# Patient Record
Sex: MALE | Race: WHITE | Employment: FULL TIME | URBAN - METROPOLITAN AREA
[De-identification: names, ages, dates, MRNs, and addresses within clinical notes are randomized per-mention and may not be internally consistent; named-entity substitution may affect disease eponyms.]

---

## 2019-01-03 ENCOUNTER — OFFICE VISIT (OUTPATIENT)
Dept: URGENT CARE | Facility: URGENT CARE | Age: 34
End: 2019-01-03
Payer: COMMERCIAL

## 2019-01-03 ENCOUNTER — ANCILLARY PROCEDURE (OUTPATIENT)
Dept: GENERAL RADIOLOGY | Facility: CLINIC | Age: 34
End: 2019-01-03
Payer: COMMERCIAL

## 2019-01-03 VITALS
OXYGEN SATURATION: 96 % | BODY MASS INDEX: 32.97 KG/M2 | WEIGHT: 236.4 LBS | SYSTOLIC BLOOD PRESSURE: 127 MMHG | TEMPERATURE: 99.5 F | DIASTOLIC BLOOD PRESSURE: 84 MMHG | HEART RATE: 88 BPM

## 2019-01-03 DIAGNOSIS — J22 LOWER RESPIRATORY INFECTION: Primary | ICD-10-CM

## 2019-01-03 LAB
FLUAV+FLUBV AG SPEC QL: NEGATIVE
FLUAV+FLUBV AG SPEC QL: NEGATIVE
SPECIMEN SOURCE: NORMAL

## 2019-01-03 PROCEDURE — 99214 OFFICE O/P EST MOD 30 MIN: CPT | Performed by: PHYSICIAN ASSISTANT

## 2019-01-03 PROCEDURE — 87804 INFLUENZA ASSAY W/OPTIC: CPT | Performed by: PHYSICIAN ASSISTANT

## 2019-01-03 PROCEDURE — 71046 X-RAY EXAM CHEST 2 VIEWS: CPT

## 2019-01-03 RX ORDER — AZITHROMYCIN 250 MG/1
TABLET, FILM COATED ORAL
Qty: 6 TABLET | Refills: 0 | Status: SHIPPED | OUTPATIENT
Start: 2019-01-03 | End: 2019-02-26

## 2019-01-03 RX ORDER — BENZONATATE 200 MG/1
200 CAPSULE ORAL 3 TIMES DAILY PRN
Qty: 30 CAPSULE | Refills: 0 | Status: SHIPPED | OUTPATIENT
Start: 2019-01-03 | End: 2019-02-26

## 2019-01-03 RX ORDER — ALBUTEROL SULFATE 90 UG/1
2 AEROSOL, METERED RESPIRATORY (INHALATION) EVERY 4 HOURS PRN
Qty: 1 INHALER | Refills: 0 | Status: SHIPPED | OUTPATIENT
Start: 2019-01-03 | End: 2024-03-18

## 2019-01-03 ASSESSMENT — ENCOUNTER SYMPTOMS
WEIGHT LOSS: 0
CARDIOVASCULAR NEGATIVE: 1
DIAPHORESIS: 0
GASTROINTESTINAL NEGATIVE: 1
SPUTUM PRODUCTION: 1
SORE THROAT: 1
MYALGIAS: 1
COUGH: 1
EYE PAIN: 0
SHORTNESS OF BREATH: 1
HEMOPTYSIS: 0
PALPITATIONS: 0
WHEEZING: 1
CHILLS: 1
FEVER: 1

## 2019-01-04 NOTE — PROGRESS NOTES
SUBJECTIVE:     HPI  Eren Contreras is a 33 year old male who presents to clinic today for the following health issues:  RESPIRATORY SYMPTOMS    Duration: 1week    Description  nasal congestion, sore throat, cough, shortness of breath, wheezing, fever, chills and myalgias    Severity: moderate    Accompanying signs and symptoms: productive cough but no hemoptysis.  No abdominal pain, n/v, constipation, diarrhea, bloody or black tarry stools.      History (predisposing factors):  tobacco abuse, ill contacts but no pmh of asthma.     Precipitating or alleviating factors: None    Therapies tried and outcome:  rest and fluids with minimal relief    Reviewed PMH, FMH and SOH.  Patient Active Problem List   Diagnosis     CARDIOVASCULAR SCREENING; LDL GOAL LESS THAN 160     Seizures (H)     Influenza A     Current Outpatient Medications   Medication Sig Dispense Refill     OXcarbazepine (TRILEPTAL) 600 MG tablet Take 600 mg by mouth 2 times daily.       OXCARBAZEPINE PO Take 150 mg by mouth 2 times daily       No Known Allergies    Review of Systems   Constitutional: Positive for chills and fever. Negative for diaphoresis and weight loss.   HENT: Positive for congestion and sore throat.    Eyes: Negative for pain.   Respiratory: Positive for cough, sputum production, shortness of breath and wheezing. Negative for hemoptysis.    Cardiovascular: Negative.  Negative for chest pain and palpitations.   Gastrointestinal: Negative.    Musculoskeletal: Positive for myalgias.   Skin: Negative.    All other systems reviewed and are negative.      /84   Pulse 88   Temp 99.5  F (37.5  C) (Tympanic)   Wt 107.2 kg (236 lb 6.4 oz)   SpO2 96%   BMI 32.97 kg/m    Physical Exam   Constitutional: He is oriented to person, place, and time. He appears well-developed and well-nourished. No distress.   HENT:   Head: Normocephalic and atraumatic.   Nose: Nose normal.   Mouth/Throat: Uvula is midline, oropharynx is clear and moist and  mucous membranes are normal. No oropharyngeal exudate or posterior oropharyngeal erythema.   TMs are intact without any erythema or bulging bilaterally.  Airway is patent.   Eyes: Conjunctivae and EOM are normal. Pupils are equal, round, and reactive to light. No scleral icterus.   Neck: Normal range of motion. Neck supple. No thyromegaly present.   Cardiovascular: Normal rate, regular rhythm, normal heart sounds and intact distal pulses. Exam reveals no gallop and no friction rub.   No murmur heard.  Pulmonary/Chest: Effort normal. No accessory muscle usage. No respiratory distress. He has no decreased breath sounds. He has no wheezes. He has no rhonchi. He has rales in the right lower field.   Lymphadenopathy:     He has no cervical adenopathy.   Neurological: He is alert and oriented to person, place, and time.   Skin: Skin is warm, dry and intact. No cyanosis. Nails show no clubbing.   Psychiatric: He has a normal mood and affect. His behavior is normal.   Nursing note and vitals reviewed.  CXR PA/lateral:  No infiltrates, effusions or pneumothorax.  No suspicious nodules or lesions. No fractures.  Per my read.   Will send for overread.        Assessment/Plan:  Lower respiratory infection:  CXR was negative for pneumonia.  Rapid influenza was also negative.  Will treat with zithromax X5days, tessalon perles, and albuterol inh as needed for symptoms.  Recommend treatment with rest, fluids and chicken soup. Tylenol/ibuprofen prn fever/pain.  Recheck in clinic if symptoms worsen or if symptoms do not improve.  To the ER if he develops hemoptysis, chest pain, fevers>102, worsening shortness of breath/wheezing.    -     Influenza A/B antigen  -     XR Chest 2 Views  -     azithromycin (ZITHROMAX) 250 MG tablet; 2 tablets the first day, then 1 tablet daily for the next 4 days  -     albuterol (PROAIR HFA/PROVENTIL HFA/VENTOLIN HFA) 108 (90 Base) MCG/ACT inhaler; Inhale 2 puffs into the lungs every 4 hours as needed  for shortness of breath / dyspnea or wheezing  -     benzonatate (TESSALON) 200 MG capsule; Take 1 capsule (200 mg) by mouth 3 times daily as needed for cough          Albertina See NEGRO Giron

## 2019-02-26 ENCOUNTER — OFFICE VISIT (OUTPATIENT)
Dept: FAMILY MEDICINE | Facility: CLINIC | Age: 34
End: 2019-02-26
Payer: COMMERCIAL

## 2019-02-26 ENCOUNTER — TELEPHONE (OUTPATIENT)
Dept: FAMILY MEDICINE | Facility: CLINIC | Age: 34
End: 2019-02-26

## 2019-02-26 VITALS
HEART RATE: 83 BPM | BODY MASS INDEX: 32.78 KG/M2 | RESPIRATION RATE: 16 BRPM | DIASTOLIC BLOOD PRESSURE: 83 MMHG | OXYGEN SATURATION: 96 % | WEIGHT: 235 LBS | SYSTOLIC BLOOD PRESSURE: 127 MMHG | TEMPERATURE: 98.1 F

## 2019-02-26 DIAGNOSIS — Z91.81 PERSONAL HISTORY OF FALL: ICD-10-CM

## 2019-02-26 DIAGNOSIS — S06.0X0A CONCUSSION WITHOUT LOSS OF CONSCIOUSNESS, INITIAL ENCOUNTER: Primary | ICD-10-CM

## 2019-02-26 DIAGNOSIS — M54.2 NECK PAIN ON LEFT SIDE: ICD-10-CM

## 2019-02-26 PROCEDURE — 99214 OFFICE O/P EST MOD 30 MIN: CPT | Performed by: INTERNAL MEDICINE

## 2019-02-26 ASSESSMENT — PAIN SCALES - GENERAL: PAINLEVEL: MODERATE PAIN (4)

## 2019-02-26 NOTE — PROGRESS NOTES
SUBJECTIVE:  Eren Contreras is an 33 year old male who presents for fall.  This morning about 6:30 am, he slipped and fell and whacked the back of his head on the steel step of his truck.  Felt stunned for a few seconds, doesn't think he lost consciousness. Later started to feel like his head is fuzzy.  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  Feels like takes more effort to focus when reading.  No n/v.  No numbness or weakness.  Left neck and left shoulder hurt some.  No headache.  Has a bump where he hit his head.  Has h/o seizures.   Did not have a seizure today, last seizure was about 2005 and he is on medications.      PMH:   has a past medical history of Seizures (H) (8/1/2014).  Patient Active Problem List   Diagnosis     CARDIOVASCULAR SCREENING; LDL GOAL LESS THAN 160     Seizures (H)     Influenza A     Social History     Socioeconomic History     Marital status:      Spouse name: None     Number of children: None     Years of education: None     Highest education level: None   Occupational History     None   Social Needs     Financial resource strain: None     Food insecurity:     Worry: None     Inability: None     Transportation needs:     Medical: None     Non-medical: None   Tobacco Use     Smoking status: Current Every Day Smoker     Packs/day: 1.00     Types: Cigarettes     Smokeless tobacco: Never Used   Substance and Sexual Activity     Alcohol use: No     Drug use: No     Sexual activity: Yes     Partners: Female   Lifestyle     Physical activity:     Days per week: None     Minutes per session: None     Stress: None   Relationships     Social connections:     Talks on phone: None     Gets together: None     Attends Hinduism service: None     Active member of club or organization: None     Attends meetings of clubs or organizations: None     Relationship status: None     Intimate partner violence:     Fear of current or ex partner: None      Emotionally abused: None     Physically abused: None     Forced sexual activity: None   Other Topics Concern     Parent/sibling w/ CABG, MI or angioplasty before 65F 55M? Not Asked   Social History Narrative     None     Family History   Problem Relation Age of Onset     Other Cancer Maternal Grandmother        ALLERGIES:  Patient has no known allergies.    Current Outpatient Medications   Medication     OXcarbazepine (TRILEPTAL) 600 MG tablet     albuterol (PROAIR HFA/PROVENTIL HFA/VENTOLIN HFA) 108 (90 Base) MCG/ACT inhaler     OXCARBAZEPINE PO     No current facility-administered medications for this visit.          ROS:  ROS is done and is negative for general/constitutional, eye, ENT, Respiratory, cardiovascular, GI, , Skin, musculoskeletal except as noted elsewhere.  All other review of systems negative except as noted elsewhere.      OBJECTIVE:  /83   Pulse 83   Temp 98.1  F (36.7  C) (Tympanic)   Resp 16   Wt 106.6 kg (235 lb)   SpO2 96%   BMI 32.78 kg/m    GENERAL APPEARANCE: Alert, in no acute distress  HEAD: posterior scalp with area of mild edema which is mildly tender, no bruising or erythema, no laceration, no palpable step offs.  EYES: normal, EOM's intact and PERRLA  EARS: External ears normal. Canals clear. TM's normal.  NOSE:normal  OROPHARYNX:normal  NECK:No adenopathy,masses or thyromegaly  RESP: normal and clear to auscultation  CV:regular rate and rhythm and no murmurs, clicks, or gallops  ABDOMEN: Abdomen soft, non-tender. BS normal. No masses, organomegaly  SKIN: no ulcers, lesions or rash  MUSCULOSKELETAL:left and posterior neck musculature with moderate tightness and mild tenderness extending toward left shoulder. normal rom of neck. Normal rom of left shoulder  NEURO: CN 2-12 grossly intact.  Strength 5/5 and symmetric in bilateral upper and lower extremities.  DTRs 2+ and symmetric in bilateral upper and lower extremities.  Sensation to light touch grossly intact in  bilateral upper and lower extremities.    RESULTS  .  No results found for this or any previous visit (from the past 48 hour(s)).    ASSESSMENT/PLAN:    ASSESSMENT / PLAN:  (S06.0X0A) Concussion without loss of consciousness, initial encounter  (primary encounter diagnosis)  Comment: pt fell and hit head, c/w concussion.  Has h/o seizures but no  Seizure activity before or after fall.  sxs c/w concussion.  Currently no evidence of intracranial hemorrhage or stroke.  Plan: CONCUSSION  REFERRAL        refer to concussion  for f/u and ongoing care.  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  He may resume driving when his mentation  And reaction times are normal.  I reviewed supportive care including icing the area of swelling, otc meds to use if needed (tylenol not nsaids), expected course, and signs of concern.  Follow up as needed with pcp if he does not improve within 1 week(s) or if worsens in any way.  Reviewed red flag symptoms and pt is given printed information about concussion and signs of concern and is to go to the ER if experiences any of these.    (M54.2) Neck pain on left side  Comment: currently c/w muscular etiology.  From his fall  Plan: I reviewed supportive care including icing the area, otc meds to use if needed, expected course, and signs of concern.  Follow up as needed or if he does not improve within 1 week(s) or if worsens in any way.  Reviewed red flag symptoms and is to go to the ER if experiences any of these.    (Z91.81) Personal history of fall  Comment: slipped on ice and hit head  Plan: has h/o seizures but no seizure activity before or after the fall, nor contributing to fall.  Plan for injuries as above.      See [facility] for orders, medications, letters, patient instructions    Sayda Amaya M.D.

## 2019-02-26 NOTE — TELEPHONE ENCOUNTER
"Eren Contreras is a 33 year old male who calls with a head injury.    NURSING ASSESSMENT:  Description:  Patient was getting out of his truck and put his foot down on the ice-covered ground and slipped. Hit back of head on the edge of his truck. Lump on back of head but no bleeding.  Onset/duration:  1 hour ago  Loss of consciousness: not sure but it sounds possible. Patient states he \"woke up to my daughter yelling at me but it was like everything was silent\"  Syncope: no  Headache: no. Head feels \"fuzzy\"  Any nausea or vomiting: no  Any amnesia or confusion: no  Any blurring of vision: no  Chest pain or palpitation: no   Taking any blood thinners (Plavix, ASA, warfarin, etc): no     NURSING PLAN: Nursing advice to patient appointment today. Appointment in 1 hour with Dr Amaya  at 8:40 am.  Informed patient that if any new symptoms develop, to go to ER or call 911 and patient states he understands this.    RECOMMENDED DISPOSITION:  appointment  Will comply with recommendation: Yes    Mena Merrill RN, BSN                        "

## 2019-02-26 NOTE — NURSING NOTE
"Chief Complaint   Patient presents with     Head Injury     pt fell getting into his truck, hit the back of his head DOI 2/26/19. He c/o head, neck and shoulder pain       Initial /83   Pulse 83   Temp 98.1  F (36.7  C) (Tympanic)   Resp 16   Wt 106.6 kg (235 lb)   SpO2 96%   BMI 32.78 kg/m   Estimated body mass index is 32.78 kg/m  as calculated from the following:    Height as of 7/20/16: 1.803 m (5' 11\").    Weight as of this encounter: 106.6 kg (235 lb).  Medication Reconciliation: complete  Tere Ortiz MA    "

## 2019-02-26 NOTE — LETTER
February 26, 2019      Eren Contreras  [address] [place], [place] [address]        To Whom It May Concern:    Eren Contreras was seen in our clinic. He is not to work on 2/26/2019.  He may also need to miss work on 2/27 and 2/28/2019.  When his symptoms have improved, he may return to work without restrictions.      Sincerely,        Sayda Amaya MD

## 2021-08-20 ENCOUNTER — NURSE TRIAGE (OUTPATIENT)
Dept: NURSING | Facility: CLINIC | Age: 36
End: 2021-08-20

## 2021-08-20 NOTE — TELEPHONE ENCOUNTER
Patient's wife calling - verbal consent given by patient over the phone.  Patient's wife tested positive for COVID19 yesterday.  His symptoms started Wednesday.  Symptoms include cough, fever, shortness of breath. Last temperature was 103.0 taken in ear.    Triaged to disposition of Go to ED Now (or PCP Triage).  Patient does not have PCP.  Advised ED now.  Patient requests telephone visit with provider.  Transferred to scheduling.  Advised to call back if symptoms worsen.    Padmini Flores RN  Triage Nurse Advisor    COVID 19 Nurse Triage Plan/Patient Instructions    Please be aware that novel coronavirus (COVID-19) may be circulating in the community. If you develop symptoms such as fever, cough, or SOB or if you have concerns about the presence of another infection including coronavirus (COVID-19), please contact your health care provider or visit [email or web address].     Disposition/Instructions    ED Visit recommended. Follow protocol based instructions.     Bring Your Own Device:  Please also bring your smart device(s) (smart phones, tablets, laptops) and their charging cables for your personal use and to communicate with your care team during your visit.    Thank you for taking steps to prevent the spread of this virus.  o Limit your contact with others.  o Wear a simple mask to cover your cough.  o Wash your hands well and often.    Resources    [facility]: About COVID-19: [email or web address]    CDC: What to Do If You're Sick: www.cdc.gov/coronavirus/2019-ncov/about/steps-when-sick.html    CDC: Ending Home Isolation: www.cdc.gov/coronavirus/2019-ncov/hcp/disposition-in-home-patients.html     CDC: Caring for Someone: www.cdc.gov/coronavirus/2019-ncov/if-you-are-sick/care-for-someone.html     [facility]: Interim Guidance for Hospital Discharge to Home: www.health.[facility].[place].us/diseases/coronavirus/hcp/hospdischarge.pdf    [facility] clinical trials (COVID-19 research studies):  [email or web address]-clinical-trials     Below are the COVID-19 hotlines at the [place] Department of Health ([facility]). Interpreters are available.   o For health questions: Call 087-720-2550 or 1-560.201.4473 (7 a.m. to 7 p.m.)  o For questions about schools and childcare: Call 155-129-6810 or 1-350.340.3478 (7 a.m. to 7 p.m.)                     Reason for Disposition    MILD difficulty breathing (e.g., minimal/no SOB at rest, SOB with walking, pulse <100)    Additional Information    Negative: SEVERE difficulty breathing (e.g., struggling for each breath, speaks in single words)    Negative: Difficult to awaken or acting confused (e.g., disoriented, slurred speech)    Negative: Bluish (or gray) lips or face now    Negative: Shock suspected (e.g., cold/pale/clammy skin, too weak to stand, low BP, rapid pulse)    Negative: Sounds like a life-threatening emergency to the triager    Negative: [1] COVID-19 exposure AND [2] has not completed COVID-19 vaccine series AND [3] no symptoms    Negative: [1] COVID-19 exposure AND [2] completed COVID-19 vaccine series (fully vaccinated) AND [3] no symptoms    Negative: COVID-19 vaccine reaction suspected (e.g., fever, headache, muscle aches) occurring during days 1-3 after getting vaccine    Negative: COVID-19 vaccine, questions about    Negative: [1] COVID-19 vaccine series completed (fully vaccinated) in past 3 months AND [2] new-onset of COVID-19 symptoms BUT [3] no known exposure    Negative: [1] Had lab test confirmed COVID-19 infection within last 3 monthsAND [2] new-onset of COVID-19 symptoms BUT [3] no known exposure    Negative: [1] Lives with someone known to have influenza (flu test positive) AND [2] flu-like symptoms (e.g., cough, runny nose, sore throat, SOB; with or without fever)    Negative: [1] Adult with possible COVID-19 symptoms AND [2] triager concerned about severity of symptoms or other causes    Negative: COVID-19 and breastfeeding, questions  about    Negative: SEVERE or constant chest pain or pressure (Exception: mild central chest pain, present only when coughing)    Negative: MODERATE difficulty breathing (e.g., speaks in phrases, SOB even at rest, pulse 100-120)    Negative: [1] Headache AND [2] stiff neck (can't touch chin to chest)    Protocols used: CORONAVIRUS (COVID-19) DIAGNOSED OR EGEHYIAMC-X-IR 3.25

## 2021-08-21 ENCOUNTER — VIRTUAL VISIT (OUTPATIENT)
Dept: URGENT CARE | Facility: CLINIC | Age: 36
End: 2021-08-21
Payer: COMMERCIAL

## 2021-08-21 DIAGNOSIS — Z20.822 SUSPECTED COVID-19 VIRUS INFECTION: Primary | ICD-10-CM

## 2021-08-21 PROCEDURE — 99212 OFFICE O/P EST SF 10 MIN: CPT | Mod: TEL

## 2021-08-21 NOTE — PROGRESS NOTES
Eren's wife is being seen today for covid symptoms (has known covid) via phone. Eren also scheduled a visit in order to find out if there are any treatment options for covid for him. He is already taking OTC analgesics, hydrating and monitoring symptoms.   Based on his age and overall health, I do not think Eren would benefit from any additional therapy. He does not have asthma or other lung disease.   Less than 5min was spent on Eren's evaluation during his wife's visit.   Shantel Bagley MD  Family Medicine

## 2021-08-24 ENCOUNTER — VIRTUAL VISIT (OUTPATIENT)
Dept: URGENT CARE | Facility: CLINIC | Age: 36
End: 2021-08-24
Payer: COMMERCIAL

## 2021-08-24 DIAGNOSIS — R50.9 FEVER AND CHILLS: Primary | ICD-10-CM

## 2021-08-24 PROCEDURE — 99214 OFFICE O/P EST MOD 30 MIN: CPT | Mod: TEL | Performed by: EMERGENCY MEDICINE

## 2021-08-24 NOTE — PROGRESS NOTES
Phone appointment:    Assessment: 1 week history of typical Covid symptoms.  Multiple family members are positive for Covid and patient is not tested assuming positivity.  Over the last 1 to 2 days he feels as if he getting worse.  He has been having fever with a max of 102 203.  He is having headaches and drowsiness.  He does have a cough.  Intermittently slightly short of breath but overall no significant respiratory concerns.  He has had episodic diarrhea.  Patient has a history of seizure disorder but no chronic health disease otherwise.    Plan: Careful plan discussed regarding patient to be seen in the emergency department if getting more ill or no improvement in 48 hours.  Increase antipyretics to add Tylenol to his ibuprofen.  Hydrate aggressively.      CHIEF COMPLAINT: Fever.      HPI: Patient is a 36-year-old male who is had Covid-like symptoms since last Wednesday.  He has had cough, headache, drowsiness, diarrhea, and fever.  T-max over the last several days has been in the 102 103 range.  He has been taking ibuprofen.  Has been able to hydrate adequately.  Wife is tested positive for Covid and multiple family members are positive.  Patient did not get tested assuming he was positive.      ROS: See HPI otherwise normal.    No Known Allergies   Current Outpatient Medications   Medication Sig Dispense Refill     albuterol (PROAIR HFA/PROVENTIL HFA/VENTOLIN HFA) 108 (90 Base) MCG/ACT inhaler Inhale 2 puffs into the lungs every 4 hours as needed for shortness of breath / dyspnea or wheezing (Patient not taking: Reported on 2/26/2019) 1 Inhaler 0     OXcarbazepine (TRILEPTAL) 600 MG tablet Take 600 mg by mouth 2 times daily.       OXCARBAZEPINE PO Take 150 mg by mouth 2 times daily           PE: No acute distress on the phone.  Alert.  Nondyspneic sounding.        TREATMENT: None.      ASSESSMENT: Febrile illness associated with multiple symptoms highly suspicious for Covid.  Patient has no respiratory  distress symptoms and is able to hydrate so monitoring outpatient is reasonable with short-term follow-up if no improvement.      DIAGNOSIS: Fever.  Cough.      PLAN: Aggressive antipyretic therapy with ibuprofen and Tylenol.  Aggressive hydration.  Go to the emergency department if more ill in any way and go to the ER in 48 hours if no improvement.    Time: 10 minutes

## 2021-09-08 ENCOUNTER — TELEPHONE (OUTPATIENT)
Dept: FAMILY MEDICINE | Facility: CLINIC | Age: 36
End: 2021-09-08

## 2021-09-08 NOTE — TELEPHONE ENCOUNTER
Called and spoke to patient, appointment made with Reinaldo Reyes on 9/17/21.Asuncion Pedraza MA/TC

## 2021-09-08 NOTE — TELEPHONE ENCOUNTER
routing to TC pool to postpone message until appointment date, will return forms to provider then. Lidia WATTS -

## 2021-09-08 NOTE — TELEPHONE ENCOUNTER
Reason for Call:  Form, our goal is to have forms completed with 72 hours, however, some forms may require a visit or additional information.    Type of letter, form or note:  FMLA    Who is the form from?: Patient    Where did the form come from: Patient or family brought in       What clinic location was the form placed at?: [place]    Where the form was placed: box for [employer]/Folder    What number is listed as a contact on the form?: [phone]       Additional comments: please call when ready to be  Picked up    Call taken on 9/8/2021 at 8:17 AM by Tatiana Bidr

## 2021-09-08 NOTE — TELEPHONE ENCOUNTER
Needs to be seen/ establish PCP.    I haven't seen this patient since 2016.      Reinaldo Reyes PA-C

## 2021-09-16 NOTE — PROGRESS NOTES
Assessment & Plan       ICD-10-CM    1. Exposure to COVID-19 virus  Z20.822    2. Administrative encounter  Z02.9    Talk to patient about his concerns his forms were filled out and faxed and he was given a copy.  Follow-up as needed.    Return in about 1 year (around 9/17/2022) for recheck, As Needed.    NEGRO Watson [facility]    Saul Jason is a 36 year old who presents for the following health issues     HPI     Was in ED for COVID symptoms on 8/26/2021:sx started about 8/18/2021 and better 9/1/2021. Neg test of COVID on 9/1/2021 and on 9/7/2021 when back to work.   Overall feeling better. Mild cough. No wheezing or short of breath or fever.     Care Everywhere Reviewed   History of Present Illness:   HPI  Eren Contreras is a 36 y.o. male with no significant past medical history who presents to the ED with complaints of fever, headache, cough, diarrhea and shortness of breath for the last 8 days. Patient states multiple family members have recently been diagnosed with COVID-19 and he assumes that he also has this. He feels his symptoms are getting worse and therefore presented to the ED for evaluation. He endorses diarrhea once a day with ongoing painful cough, myalgias, and fevers up to 101.2. He does smoke but states this has been limited lately due to the cough and shortness of breath. He also endorses loss of taste and smell. He has been using ibuprofen as well as Tylenol for symptoms    Form completion for work.    Review of Systems   Constitutional, HEENT, cardiovascular, pulmonary, gi and gu systems are negative, except as otherwise noted.      Objective    /87   Pulse 72   Temp 98.4  F (36.9  C) (Oral)   Ht 1.829 m (6')   Wt 110.6 kg (243 lb 12.8 oz)   SpO2 96%   BMI 33.07 kg/m    Body mass index is 33.07 kg/m .  Physical Exam   GENERAL: healthy, alert and no distress  RESP: lungs clear to auscultation - no rales, rhonchi or wheezes  CV: regular rate  and rhythm, normal S1 S2, no S3 or S4, no murmur, click or rub, no peripheral edema and peripheral pulses strong  ABDOMEN: soft, nontender, no hepatosplenomegaly, no masses and bowel sounds normal  MS: no gross musculoskeletal defects noted, no edema

## 2021-09-17 ENCOUNTER — OFFICE VISIT (OUTPATIENT)
Dept: FAMILY MEDICINE | Facility: CLINIC | Age: 36
End: 2021-09-17
Payer: COMMERCIAL

## 2021-09-17 VITALS
WEIGHT: 243.8 LBS | SYSTOLIC BLOOD PRESSURE: 131 MMHG | HEIGHT: 72 IN | OXYGEN SATURATION: 96 % | BODY MASS INDEX: 33.02 KG/M2 | HEART RATE: 72 BPM | DIASTOLIC BLOOD PRESSURE: 87 MMHG | TEMPERATURE: 98.4 F

## 2021-09-17 DIAGNOSIS — Z02.9 ADMINISTRATIVE ENCOUNTER: ICD-10-CM

## 2021-09-17 DIAGNOSIS — Z20.822 EXPOSURE TO COVID-19 VIRUS: Primary | ICD-10-CM

## 2021-09-17 PROCEDURE — 99213 OFFICE O/P EST LOW 20 MIN: CPT | Performed by: PHYSICIAN ASSISTANT

## 2021-09-17 ASSESSMENT — MIFFLIN-ST. JEOR: SCORE: 2073.87

## 2023-05-15 ENCOUNTER — OFFICE VISIT (OUTPATIENT)
Dept: URGENT CARE | Facility: URGENT CARE | Age: 38
End: 2023-05-15
Payer: COMMERCIAL

## 2023-05-15 VITALS
BODY MASS INDEX: 34.12 KG/M2 | TEMPERATURE: 98.1 F | SYSTOLIC BLOOD PRESSURE: 136 MMHG | DIASTOLIC BLOOD PRESSURE: 91 MMHG | HEART RATE: 90 BPM | OXYGEN SATURATION: 97 % | WEIGHT: 251.6 LBS

## 2023-05-15 DIAGNOSIS — R10.2 SUPRAPUBIC PAIN, ACUTE: Primary | ICD-10-CM

## 2023-05-15 DIAGNOSIS — R39.15 URINARY URGENCY: ICD-10-CM

## 2023-05-15 DIAGNOSIS — N50.819 PERSISTENT TESTICULAR PAIN: ICD-10-CM

## 2023-05-15 LAB
ALBUMIN UR-MCNC: ABNORMAL MG/DL
APPEARANCE UR: CLEAR
BACTERIA #/AREA URNS HPF: ABNORMAL /HPF
BASOPHILS # BLD AUTO: 0 10E3/UL (ref 0–0.2)
BASOPHILS NFR BLD AUTO: 0 %
BILIRUB UR QL STRIP: NEGATIVE
CAOX CRY #/AREA URNS HPF: ABNORMAL /HPF
COLOR UR AUTO: YELLOW
EOSINOPHIL # BLD AUTO: 0 10E3/UL (ref 0–0.7)
EOSINOPHIL NFR BLD AUTO: 1 %
ERYTHROCYTE [DISTWIDTH] IN BLOOD BY AUTOMATED COUNT: 13.6 % (ref 10–15)
GLUCOSE UR STRIP-MCNC: NEGATIVE MG/DL
HCT VFR BLD AUTO: 47.7 % (ref 40–53)
HGB BLD-MCNC: 16.2 G/DL (ref 13.3–17.7)
HGB UR QL STRIP: ABNORMAL
KETONES UR STRIP-MCNC: NEGATIVE MG/DL
LEUKOCYTE ESTERASE UR QL STRIP: NEGATIVE
LYMPHOCYTES # BLD AUTO: 3 10E3/UL (ref 0.8–5.3)
LYMPHOCYTES NFR BLD AUTO: 43 %
MCH RBC QN AUTO: 29.9 PG (ref 26.5–33)
MCHC RBC AUTO-ENTMCNC: 34 G/DL (ref 31.5–36.5)
MCV RBC AUTO: 88 FL (ref 78–100)
MONOCYTES # BLD AUTO: 0.5 10E3/UL (ref 0–1.3)
MONOCYTES NFR BLD AUTO: 8 %
MUCOUS THREADS #/AREA URNS LPF: PRESENT /LPF
NEUTROPHILS # BLD AUTO: 3.4 10E3/UL (ref 1.6–8.3)
NEUTROPHILS NFR BLD AUTO: 48 %
NITRATE UR QL: NEGATIVE
PH UR STRIP: 6 [PH] (ref 5–7)
PLATELET # BLD AUTO: 185 10E3/UL (ref 150–450)
RBC # BLD AUTO: 5.41 10E6/UL (ref 4.4–5.9)
RBC #/AREA URNS AUTO: ABNORMAL /HPF
SP GR UR STRIP: >=1.03 (ref 1–1.03)
SQUAMOUS #/AREA URNS AUTO: ABNORMAL /LPF
UROBILINOGEN UR STRIP-ACNC: 0.2 E.U./DL
WBC # BLD AUTO: 7 10E3/UL (ref 4–11)
WBC #/AREA URNS AUTO: ABNORMAL /HPF

## 2023-05-15 PROCEDURE — 87491 CHLMYD TRACH DNA AMP PROBE: CPT | Performed by: FAMILY MEDICINE

## 2023-05-15 PROCEDURE — 36415 COLL VENOUS BLD VENIPUNCTURE: CPT | Performed by: FAMILY MEDICINE

## 2023-05-15 PROCEDURE — 87591 N.GONORRHOEAE DNA AMP PROB: CPT | Performed by: FAMILY MEDICINE

## 2023-05-15 PROCEDURE — 99213 OFFICE O/P EST LOW 20 MIN: CPT | Performed by: FAMILY MEDICINE

## 2023-05-15 PROCEDURE — 81001 URINALYSIS AUTO W/SCOPE: CPT | Performed by: FAMILY MEDICINE

## 2023-05-15 PROCEDURE — 85025 COMPLETE CBC W/AUTO DIFF WBC: CPT | Performed by: FAMILY MEDICINE

## 2023-05-15 ASSESSMENT — PAIN SCALES - GENERAL: PAINLEVEL: MODERATE PAIN (4)
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
## 2023-05-16 ENCOUNTER — TELEPHONE (OUTPATIENT)
Dept: UROLOGY | Facility: CLINIC | Age: 38
End: 2023-05-16
Payer: COMMERCIAL

## 2023-05-16 LAB
C TRACH DNA SPEC QL PROBE+SIG AMP: NEGATIVE
N GONORRHOEA DNA SPEC QL NAA+PROBE: NEGATIVE

## 2023-05-16 NOTE — LETTER
[address]  [facility] [address]  [phone]      May 24, 2023    Eren Contreras                                                                                                                     [address] [facility] [address]            Dear Eren,    Dear Eren Contreras      We, at [facility] want to help you receive better care.  We have been unable to reach you via phone and  are asking that you schedule an appointment with one of our urologists. You can call the clinic at [phone] to schedule an appointment.      Sincerely,      [facility]

## 2023-05-16 NOTE — TELEPHONE ENCOUNTER
[facility] Call Center    Phone Message    May a detailed message be left on voicemail: yes     Reason for Call: Patient being referred for Urgent Appointment (1-2 weeks) for Micro Hematuria. Patient only has one day off right now on 6/20. I tried to pull up template for Dr. Krishnan in [place], but unable to pull it up. Sending for clinic review and follow-up with patient. Okay to leave detailed message on voicemail. Thank you!    Action Taken: [facility]      Travel Screening: Not Applicable

## 2023-05-17 NOTE — TELEPHONE ENCOUNTER
Writer called and left VM for patient to call back.  Awaiting call back.    Alyson TREVINO RN Urology 5/17/2023 9:23 AM

## 2023-05-24 NOTE — TELEPHONE ENCOUNTER
Writer called and left a second voicemail.  Letter sent via [facility] to schedule an appointment.    Alyson TREVINO RN Urology 5/24/2023 8:17 AM

## 2023-05-30 NOTE — TELEPHONE ENCOUNTER
The patient returned call regarding message below. He is requesting 6/20/23 if possible. Writer verified phone number is correct. Please contact patient again. Thank you.

## 2023-05-30 NOTE — TELEPHONE ENCOUNTER
Appt made for next available. Offered pt [facility] scheduling, however he refused.    Alyson TREVINO RN Urology 5/30/2023 1:59 PM

## 2023-10-06 ENCOUNTER — OFFICE VISIT (OUTPATIENT)
Dept: URGENT CARE | Facility: URGENT CARE | Age: 38
End: 2023-10-06

## 2023-10-06 ENCOUNTER — NURSE TRIAGE (OUTPATIENT)
Dept: INTERNAL MEDICINE | Facility: CLINIC | Age: 38
End: 2023-10-06

## 2023-10-06 ENCOUNTER — ANCILLARY PROCEDURE (OUTPATIENT)
Dept: GENERAL RADIOLOGY | Facility: CLINIC | Age: 38
End: 2023-10-06
Attending: PHYSICIAN ASSISTANT

## 2023-10-06 VITALS
BODY MASS INDEX: 33.63 KG/M2 | RESPIRATION RATE: 16 BRPM | WEIGHT: 248 LBS | TEMPERATURE: 98.4 F | SYSTOLIC BLOOD PRESSURE: 150 MMHG | OXYGEN SATURATION: 97 % | HEART RATE: 72 BPM | DIASTOLIC BLOOD PRESSURE: 92 MMHG
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
## 2023-10-06 PROCEDURE — 90714 TD VACC NO PRESV 7 YRS+ IM: CPT | Performed by: PHYSICIAN ASSISTANT

## 2023-10-06 PROCEDURE — 73140 X-RAY EXAM OF FINGER(S): CPT | Mod: TC | Performed by: RADIOLOGY

## 2023-10-06 PROCEDURE — 90471 IMMUNIZATION ADMIN: CPT | Performed by: PHYSICIAN ASSISTANT

## 2023-10-06 PROCEDURE — 99213 OFFICE O/P EST LOW 20 MIN: CPT | Mod: 25 | Performed by: PHYSICIAN ASSISTANT

## 2023-10-06 RX ORDER — AMOXICILLIN AND CLAVULANATE POTASSIUM 500; 125 MG/1; MG/1
1 TABLET, FILM COATED ORAL 3 TIMES DAILY
Qty: 15 TABLET | Refills: 1 | Status: SHIPPED | OUTPATIENT
Start: 2023-10-06 | End: 2023-10-11

## 2023-10-06 NOTE — PROGRESS NOTES
HPI  pleasant, NAD 37 yo with right thumb dog bite last night. It's his own dog, dog is completely UTD on shots, denies any numbness or tingling, was actually in a tug of war with  dog ( dog is not himself  -- is having it removed from home) dog is acting its notrmal self today      ROS been years since last tetanus ( 10or 15?)  shot, hx  seizures, on trileptal no known DM , works physical job and is not SHORT OF BREATH on the job      Physical Exam  Constitutional:       General: He is not in acute distress.     Appearance: Normal appearance. He is well-developed. He is not diaphoretic.   HENT:      Head: Normocephalic and atraumatic.      Right Ear: Ear canal and external ear normal. No drainage.      Left Ear: Ear canal and external ear normal. No drainage.      Nose: Nose normal.      Mouth/Throat:      Pharynx: No oropharyngeal exudate.   Eyes:      General: Lids are normal. No scleral icterus.        Right eye: No discharge.         Left eye: No discharge.      Conjunctiva/sclera: Conjunctivae normal.      Right eye: Right conjunctiva is not injected.      Left eye: Left conjunctiva is not injected.      Pupils: Pupils are equal, round, and reactive to light.   Neck:      Thyroid: No thyromegaly.      Trachea: No tracheal deviation.   Cardiovascular:      Rate and Rhythm: Normal rate and regular rhythm.      Pulses: Normal pulses.      Heart sounds: Normal heart sounds. No murmur heard.     No friction rub.   Pulmonary:      Effort: Pulmonary effort is normal. No respiratory distress.      Breath sounds: Normal breath sounds. No stridor. No wheezing, rhonchi or rales.      Comments: Respiratory rate normal, no stridor, no respiratory distress,No wheeze, no retractions, no rales, breath sounds present in all fields, nl bronchophony and fremitus, non-tender to palp      Chest:      Chest wall: No tenderness.   Abdominal:      General: Bowel sounds are normal.      Palpations: Abdomen is soft.      Tenderness:  There is no abdominal tenderness.   Genitourinary:     Rectum: Guaiac result negative.   Musculoskeletal:         General: Normal range of motion.      Cervical back: Normal range of motion and neck supple. No edema or rigidity. No spinous process tenderness.      Comments: Right thumb + puncture wounds x 3: jsut distal to dip dorsal crease  radially;  #2 just distal and toward pulp of thumb, #3 at radial margin of middle 1/3rd nail, no rubor, no calor no proxiaml lymphatic streaking , no epitrochlear or axilalry nodes ( to early)     Lymphadenopathy:      Cervical: No cervical adenopathy.      Right cervical: No superficial or posterior cervical adenopathy.     Left cervical: No superficial or posterior cervical adenopathy.   Skin:     General: Skin is warm.      Nails: There is no clubbing.   Neurological:      Mental Status: He is alert.      Cranial Nerves: No cranial nerve deficit.      Sensory: No sensory deficit.   Psychiatric:         Speech: Speech normal.         Behavior: Behavior normal.         Thought Content: Thought content normal.         Summary / plan:  ZACHARY called wet read of xray (per ZACHARY)  nl xray  Start refill after 5 days if any redness persisting and seek re-exam  Discussed the course of animal bites and signs of worrisome inflection -- get seen if any doubt.  No interaction with tripletal  noted in [employer]

## 2023-10-06 NOTE — TELEPHONE ENCOUNTER
"S-(situation): dog bite on right hand    B-(background): was bit last evening. It bled a lot and patient cleaned it out.     A-(assessment): one puncture is still open and one area that looks the size of a thumb tack.     R-(recommendations): recommended to go to the ER/UCC.      Reason for Disposition   Cut (length > 1/8 inch or 3 mm) or skin tear and any animal  (Exception: Superficial scratch that doesn't go through dermis.)    Additional Information   Negative: Major bleeding (actively dripping or spurting) that can't be stopped   Negative: Sounds like a life-threatening emergency to the triager   Negative: Any break in skin from BITE (e.g., cut, puncture, or scratch) and WILD animal at risk for RABIES (e.g., bat, raccoon, pace, skunk, coyote, other carnivores; see Background for list)   Negative: Any break in skin from BITE (e.g., cut, puncture, or scratch) and PET animal (e.g., dog, cat, or ferret) at risk for RABIES (e.g., sick, stray, unprovoked bite, developing country)   Negative: Any break in skin from BITE (e.g., cut, puncture, or scratch) and monkey    Answer Assessment - Initial Assessment Questions  1. ANIMAL: \"What type of animal caused the bite?\" \"Is the injury from a bite or a claw?\" If the animal is a dog or a cat, ask: \"Was it a pet or a stray?\" \"Was it acting ill or behaving strangely?\"      dog  2. LOCATION: \"Where is the bite located?\"       Right hand  3. SIZE: \"How big is the bite?\" \"What does it look like?\"       Right thumb- on side where pad of thumb, another on right side of thumbnail, right side of thumb there is a cut.    4. ONSET: \"When did the bite happen?\" (Minutes or hours ago)       Last evening  5. CIRCUMSTANCES: \"Tell me how this happened.\"       They think the dog has night terrors.  Wakes up at night barking, snarling and biting at the air.    6. TETANUS: \"When was your last tetanus booster?\"      20 years ago  7. RABIES VACCINE: For dog or cat bites, ask: \"Do you know if the " "pet is vaccinated against rabies?\"  (e.g., yes, no, overdue for rabies shot, unknown)      Has had all shots  8. PREGNANCY: \"Is there any chance you are pregnant?\" \"When was your last menstrual period?\"      N/a    Protocols used: Animal Bite-A-[place]    "

## 2023-11-20 ENCOUNTER — TELEPHONE (OUTPATIENT)
Dept: FAMILY MEDICINE | Facility: CLINIC | Age: 38
End: 2023-11-20

## 2023-11-20 NOTE — LETTER
November 20, 2023    To  Eren Contreras  [address] [facility] [address]    Your team at [facility] cares about your health. We have reviewed your chart and based on our findings; we are making the following recommendations to better manage your health.     You are in particular need of attention regarding the following:     PREVENTATIVE VISIT: Physical    If you have already completed these items, please contact the clinic via phone or   MyChart so your care team can review and update your records. Thank you for   choosing [facility] for your healthcare needs. For any questions,   concerns, or to schedule an appointment please contact our clinic.    Healthy Regards,      Your [facility] Care Team

## 2023-11-20 NOTE — TELEPHONE ENCOUNTER
Patient Quality Outreach    Patient is due for the following:   Depression  -  PHQ-9 needed  Physical Preventive Adult Physical      Topic Date Due    COVID-19 Vaccine (1) Never done    Pneumococcal Vaccine (1 - PCV) Never done    Hepatitis B Vaccine (2 of 3 - 3-dose series) 01/24/2003    Flu Vaccine (1) Never done    Diptheria Tetanus Pertussis (DTAP/TDAP/TD) Vaccine (1 - Tdap) 10/07/2023       Next Steps:   Schedule a Adult Preventative    Type of outreach:    Sent [employer] message.      Questions for provider review:    None           Candace Duncan, CMA

## 2023-12-04 ENCOUNTER — OFFICE VISIT (OUTPATIENT)
Dept: URGENT CARE | Facility: URGENT CARE | Age: 38
End: 2023-12-04
Payer: COMMERCIAL

## 2023-12-04 VITALS
WEIGHT: 254 LBS | DIASTOLIC BLOOD PRESSURE: 95 MMHG | OXYGEN SATURATION: 98 % | TEMPERATURE: 98.7 F | RESPIRATION RATE: 16 BRPM | BODY MASS INDEX: 34.45 KG/M2 | HEART RATE: 88 BPM | SYSTOLIC BLOOD PRESSURE: 145 MMHG

## 2023-12-04 DIAGNOSIS — R56.9 SEIZURES (H): ICD-10-CM

## 2023-12-04 DIAGNOSIS — J01.90 ACUTE BACTERIAL RHINOSINUSITIS: Primary | ICD-10-CM

## 2023-12-04 DIAGNOSIS — F17.200 SMOKER: ICD-10-CM

## 2023-12-04 DIAGNOSIS — B96.89 ACUTE BACTERIAL RHINOSINUSITIS: Primary | ICD-10-CM

## 2023-12-04 PROCEDURE — 99213 OFFICE O/P EST LOW 20 MIN: CPT | Performed by: NURSE PRACTITIONER

## 2023-12-04 NOTE — PROGRESS NOTES
Assessment & Plan       1. Acute bacterial rhinosinusitis  Home care reviewed. Patient verbalized understanding; will monitor symptoms closely. Reviewed s/e to medications.   Follow up with primary care in 1 week if symptoms not improving.     Handout given from epic and reviewed.    - amoxicillin-clavulanate (AUGMENTIN) 875-125 MG tablet; Take 1 tablet by mouth 2 times daily for 7 days  Dispense: 14 tablet; Refill: 0    2. Seizures (H)  Stable      3. Smoker  Discussed recommend cessation      BLANCA Fuentes [facility]    Saul Jason is a 38 year old male who presents to clinic today for the following health issues:  Chief Complaint   Patient presents with    Urgent Care    URI     Per patient symptoms started last week Wednesday symptoms cough, left ear pain, left side throat pain, fever , left eye discharge, and body aches only on Friday      HPI      URI Adult    Onset of symptoms was 7 day(s) ago.  Course of illness is worsening.    Severity moderate  Current and Associated symptoms: fever, chills, runny nose, stuffy nose, ear pain left, sore throat, and facial pain/pressure  Treatment measures tried include Tylenol/Ibuprofen, OTC Cough med, Fluids, and Rest.  Predisposing factors include ill contact: Family member .      Review of Systems  Constitutional, HEENT, cardiovascular, pulmonary, gi and gu systems are negative, except as otherwise noted.      Objective    BP (!) 145/95   Pulse 88   Temp 98.7  F (37.1  C) (Tympanic)   Resp 16   Wt 115.2 kg (254 lb)   SpO2 98%   BMI 34.45 kg/m    Physical Exam   GENERAL: alert and no distress  EYES: Eyes grossly normal to inspection, PERRL and conjunctivae and sclerae normal  HENT: normal cephalic/atraumatic, left ear: erythematous, nose and mouth without ulcers or lesions, nasal mucosa edematous , rhinorrhea clear, oropharynx clear, oral mucous membranes moist, and sinuses: maxillary, frontal tenderness on  bilateral  NECK: bilateral anterior cervical adenopathy, no asymmetry, masses, or scars, and thyroid normal to palpation  RESP: lungs clear to auscultation - no rales, rhonchi or wheezes  CV: regular rate and rhythm, normal S1 S2, no S3 or S4, no murmur, click or rub, no peripheral edema and peripheral pulses strong  ABDOMEN: soft, nontender, no hepatosplenomegaly, no masses and bowel sounds normal  MS: no gross musculoskeletal defects noted, no edema  SKIN: no suspicious lesions or rashes

## 2023-12-04 NOTE — PATIENT INSTRUCTIONS
Home care for sinusitis includes; antibiotic take Augmentin 2 times daily with food as this may cause stomach upset. Please take with a probiotic (not directly with) two hours prior or after, to protect good bacteria in colon.   Humidifier in room if available. Recommend saline lavage to help remove mucous and moisturize sinuses.     Please follow up in clinic, with your primary care provider if symptoms not improving with treatment.

## 2024-02-23 ENCOUNTER — HOSPITAL ENCOUNTER (OUTPATIENT)
Dept: ULTRASOUND IMAGING | Facility: CLINIC | Age: 39
Discharge: HOME | End: 2024-02-23
Payer: COMMERCIAL

## 2024-02-23 ENCOUNTER — OFFICE VISIT (OUTPATIENT)
Dept: FAMILY MEDICINE | Facility: CLINIC | Age: 39
End: 2024-02-23
Payer: COMMERCIAL

## 2024-02-23 ENCOUNTER — OFFICE VISIT (OUTPATIENT)
Dept: PEDIATRICS | Facility: CLINIC | Age: 39
End: 2024-02-23
Payer: COMMERCIAL

## 2024-02-23 VITALS
OXYGEN SATURATION: 96 % | SYSTOLIC BLOOD PRESSURE: 170 MMHG | HEART RATE: 71 BPM | TEMPERATURE: 97.7 F | BODY MASS INDEX: 35 KG/M2 | HEIGHT: 71 IN | RESPIRATION RATE: 20 BRPM | WEIGHT: 250 LBS | DIASTOLIC BLOOD PRESSURE: 91 MMHG

## 2024-02-23 VITALS
RESPIRATION RATE: 16 BRPM | BODY MASS INDEX: 35.11 KG/M2 | HEART RATE: 80 BPM | OXYGEN SATURATION: 98 % | TEMPERATURE: 97.6 F | DIASTOLIC BLOOD PRESSURE: 94 MMHG | HEIGHT: 71 IN | WEIGHT: 250.8 LBS | SYSTOLIC BLOOD PRESSURE: 128 MMHG

## 2024-02-23 DIAGNOSIS — R10.9 FLANK PAIN: Primary | ICD-10-CM

## 2024-02-23 DIAGNOSIS — Z87.442 HISTORY OF NEPHROLITHIASIS: ICD-10-CM

## 2024-02-23 DIAGNOSIS — R10.9 ACUTE LEFT FLANK PAIN: ICD-10-CM

## 2024-02-23 DIAGNOSIS — R10.9 ACUTE LEFT FLANK PAIN: Primary | ICD-10-CM

## 2024-02-23 LAB
ALBUMIN SERPL BCG-MCNC: 3.9 G/DL (ref 3.5–5.2)
ALBUMIN UR-MCNC: NEGATIVE MG/DL
ALP SERPL-CCNC: 115 U/L (ref 40–150)
ALT SERPL W P-5'-P-CCNC: 26 U/L (ref 0–70)
ANION GAP SERPL CALCULATED.3IONS-SCNC: 10 MMOL/L (ref 7–15)
APPEARANCE UR: CLEAR
AST SERPL W P-5'-P-CCNC: 19 U/L (ref 0–45)
BACTERIA #/AREA URNS HPF: ABNORMAL /HPF
BILIRUB SERPL-MCNC: 0.3 MG/DL
BILIRUB UR QL STRIP: NEGATIVE
BUN SERPL-MCNC: 12.3 MG/DL (ref 6–20)
CALCIUM SERPL-MCNC: 8.5 MG/DL (ref 8.6–10)
CHLORIDE SERPL-SCNC: 105 MMOL/L (ref 98–107)
COLOR UR AUTO: YELLOW
CREAT SERPL-MCNC: 1.19 MG/DL (ref 0.67–1.17)
DEPRECATED HCO3 PLAS-SCNC: 22 MMOL/L (ref 22–29)
EGFRCR SERPLBLD CKD-EPI 2021: 80 ML/MIN/1.73M2
ERYTHROCYTE [DISTWIDTH] IN BLOOD BY AUTOMATED COUNT: 12.9 % (ref 10–15)
GLUCOSE SERPL-MCNC: 109 MG/DL (ref 70–99)
GLUCOSE UR STRIP-MCNC: NEGATIVE MG/DL
HCT VFR BLD AUTO: 42.4 % (ref 40–53)
HGB BLD-MCNC: 14.5 G/DL (ref 13.3–17.7)
HGB UR QL STRIP: ABNORMAL
KETONES UR STRIP-MCNC: NEGATIVE MG/DL
LEUKOCYTE ESTERASE UR QL STRIP: NEGATIVE
MCH RBC QN AUTO: 30 PG (ref 26.5–33)
MCHC RBC AUTO-ENTMCNC: 34.2 G/DL (ref 31.5–36.5)
MCV RBC AUTO: 88 FL (ref 78–100)
MUCOUS THREADS #/AREA URNS LPF: PRESENT /LPF
NITRATE UR QL: NEGATIVE
PH UR STRIP: 7 [PH] (ref 5–7)
PLATELET # BLD AUTO: 146 10E3/UL (ref 150–450)
POTASSIUM SERPL-SCNC: 3.8 MMOL/L (ref 3.4–5.3)
PROCALCITONIN SERPL IA-MCNC: 0.09 NG/ML
PROT SERPL-MCNC: 6.3 G/DL (ref 6.4–8.3)
RBC # BLD AUTO: 4.84 10E6/UL (ref 4.4–5.9)
RBC #/AREA URNS AUTO: ABNORMAL /HPF
SODIUM SERPL-SCNC: 137 MMOL/L (ref 135–145)
SP GR UR STRIP: 1.02 (ref 1–1.03)
URATE SERPL-MCNC: 5.6 MG/DL (ref 3.4–7)
UROBILINOGEN UR STRIP-ACNC: 0.2 E.U./DL
WBC # BLD AUTO: 8.8 10E3/UL (ref 4–11)
WBC #/AREA URNS AUTO: ABNORMAL /HPF

## 2024-02-23 PROCEDURE — 84550 ASSAY OF BLOOD/URIC ACID: CPT

## 2024-02-23 PROCEDURE — 76775 US EXAM ABDO BACK WALL LIM: CPT

## 2024-02-23 PROCEDURE — 85027 COMPLETE CBC AUTOMATED: CPT

## 2024-02-23 PROCEDURE — 81001 URINALYSIS AUTO W/SCOPE: CPT | Performed by: PHYSICIAN ASSISTANT

## 2024-02-23 PROCEDURE — 36415 COLL VENOUS BLD VENIPUNCTURE: CPT

## 2024-02-23 PROCEDURE — 99214 OFFICE O/P EST MOD 30 MIN: CPT | Mod: 25

## 2024-02-23 PROCEDURE — 80053 COMPREHEN METABOLIC PANEL: CPT

## 2024-02-23 PROCEDURE — 99207 REFERRAL TO ACUTE AND DIAGNOSTIC SERVICES: CPT | Performed by: PHYSICIAN ASSISTANT

## 2024-02-23 PROCEDURE — 84145 PROCALCITONIN (PCT): CPT

## 2024-02-23 PROCEDURE — 96374 THER/PROPH/DIAG INJ IV PUSH: CPT

## 2024-02-23 PROCEDURE — 96361 HYDRATE IV INFUSION ADD-ON: CPT

## 2024-02-23 RX ORDER — KETOROLAC TROMETHAMINE 30 MG/ML
60 INJECTION, SOLUTION INTRAMUSCULAR; INTRAVENOUS ONCE
Status: DISCONTINUED | OUTPATIENT
Start: 2024-02-23 | End: 2024-02-23

## 2024-02-23 RX ORDER — KETOROLAC TROMETHAMINE 30 MG/ML
30 INJECTION, SOLUTION INTRAMUSCULAR; INTRAVENOUS ONCE
Status: COMPLETED | OUTPATIENT
Start: 2024-02-23 | End: 2024-02-23

## 2024-02-23 RX ADMIN — KETOROLAC TROMETHAMINE 30 MG: 30 INJECTION, SOLUTION INTRAMUSCULAR; INTRAVENOUS at 11:08

## 2024-02-23 ASSESSMENT — PAIN SCALES - GENERAL
PAINLEVEL: EXTREME PAIN (8)
PAINLEVEL: SEVERE PAIN (6)

## 2024-02-23 NOTE — PROGRESS NOTES
"  Assessment & Plan     (R10.9) Flank pain  (primary encounter diagnosis)  Comment: History of flank pain.  Patient with  history of kidney stones.  Patient states symptoms feel similar to prior kidney stone.  Has left CVA tenderness.  Also experiencing left lower quadrant abdominal tenderness.  Discussed with the patient given the severity of the symptoms following up with acute diagnostic center for further evaluation and treatment.  Patient was amenable to this.  Urinalysis does have red blood cell.  Patient will go directly to [facility].  Plan: UA Macroscopic with reflex to Microscopic and         Culture - Lab Collect, UA Microscopic with         Reflex to Culture            Process for making after hours referrals to the ADS    1.  Send an Epic message to the ADS clinic pool at patient preferred site  [place]: [facility] ([id number])  [place]: P [facility] ([id number])  [place]: P [facility] ([id number])    [place]: P [facility] ([id number])  [place]: [facility] [facility] ([id number])  Include the reason for the referral and any pertinent information.    2.  Verify that the patient is medically stable and willing to be seen shortly after 9 am the next day the clinic is open.  If the patient is having abdominal or GI symptoms, consider telling them to be NPO if you think imaging will be needed.    3.  Give the patient the phone number for the site.  Have the patient call the ADS if they haven't been contacted by 10 am.  [place]: [phone]  [place]:  [phone]  [place]: [phone]   [place]:  [phone]    [phone]      BMI  Estimated body mass index is 34.98 kg/m  as calculated from the following:    Height as of this encounter: 1.803 m (5' 11\").    Weight as of this encounter: 113.8 kg (250 lb 12.8 oz).     Saul Jason is a 38 year old, presenting for the following health issues:  Kidney Problem    History of Present Illness       Reason for " "visit:  Kidney stones    He eats 0-1 servings of fruits and vegetables daily.He consumes 2 sweetened beverage(s) daily.He exercises with enough effort to increase his heart rate 20 to 29 minutes per day.  He exercises with enough effort to increase his heart rate 5 days per week.   He is taking medications regularly.     History of right-sided kidney stone.  1 week ago patient developed left flank pain.  This has been worsening in severity.  No diarrhea or constipation.  Denies any bloody stools.  Has been nauseated without any vomiting.  Denies any overt dysuria.  Occasionally discomfort radiates into the left testicle.  No significant testicular pain.  Denies any trauma or injury associate with onset of discomfort.         Review of Systems  Constitutional, HEENT, cardiovascular, pulmonary, gi and gu systems are negative, except as otherwise noted.      Objective    BP (!) 128/94   Pulse 80   Temp 97.6  F (36.4  C) (Tympanic)   Resp 16   Ht 1.803 m (5' 11\")   Wt 113.8 kg (250 lb 12.8 oz)   SpO2 98%   BMI 34.98 kg/m    Body mass index is 34.98 kg/m .  Physical Exam   GENERAL: alert and appears uncomfortable.  Not able to sit still in examination room.  RESP: lungs clear to auscultation - no rales, rhonchi or wheezes  CV: regular rate and rhythm, normal S1 S2, no S3 or S4, no murmur, click or rub, no peripheral edema  ABDOMEN: Left CVA tenderness and left lower quadrant abdominal tenderness.  No right-sided abdominal pain.  : No significant testicular pain or swelling.  Normal-appearing external genitalia  MS: no gross musculoskeletal defects noted, no edema            Signed Electronically by: Haile Mckoy PA-C    "

## 2024-02-23 NOTE — PATIENT INSTRUCTIONS
As we discussed, although there is evidence of kidney stones within your left kidney itself, there are no stones in the ureter draining the kidneys, and no evidence of a stone obstructing any part of your urinary tract.  Given this finding, as well as the resolution of your left flank pain, it is possible that you passed a stone sometime during this visit.  In case you did not, we are going to send you home with a urine strainer, and asked that you strain your urine until such time as we capture a stone.  I have entered an order for stone composition analysis, so if you passed the stone, please bring it into the lab for analysis.    I have made a referral for you to see a urologist here at [facility].  You will be contacted to set up that actual appointment date and time.  The goal of this referral is to see if we can explain why you keep making the stones, and hopefully prevent you from making stones in the future.    Any combination of ibuprofen and acetaminophen that you need to control your pain is acceptable.  If you have recurrence of your pain, and particularly if it is severe, please do not hesitate to go to an emergency department for evaluation and treatment.

## 2024-02-23 NOTE — PROGRESS NOTES
Assessment & Plan     (R10.9) Acute left flank pain  (primary encounter diagnosis)  Comment: Patient has a history of prior nephrolithiasis, including an obstructing right ureteral stone 5/2023 which was probably passed spontaneously.  No stone was collected for analysis.  CT imaging at that time showed that he had nonobstructing stones within the left kidney.  Presented today with acute left flank pain reminiscent of his earlier right-sided stone disease.  Ultrasound today showed a nonobstructing left internal calculus measuring 10 x 9 x 7 mm.  There was no evidence of hydronephrosis.  An IV was placed here today, and the patient was given 1 L 0.9 NaCl.  He received ketorolac 30 mg IV x 1 for analgesia.  He now reports greatly improved left flank pain, with only mild residual pain.  I think that he is either still experiencing analgesia from his earlier ketorolac, or it is possible that he passed a stone while voiding a little while ago.  Either way, he is comfortable enough to tolerate discharge.  Plan:   His wife joined him today for our discussion.  She is comfortable with his discharge home this afternoon.   We will send him home with a urine collection strainer.  I have entered an order for stone analysis should he be able to collect a stone.   We discussed the possibility of urology referral as a means of possible stone prevention.  Eren readily agrees.  I have made that referral.   With regard to analgesia, Eren has oxycodone 5 mg tablets at home (he has 9 of them left from his 5/2023 ED visit), though does not want to use them partly out of concern for dependence.  We discussed that a combination of acetaminophen and an NSAID such as ibuprofen might be helpful in terms of analgesia as needed.  He will give that a try.   I advised Eren to contact his PCP office and/or present to an emergency department if he has severe symptomatic recurrence.     (Z87.493) History of nephrolithiasis  Comment: History of  "right renal stone with obstruction 5/2023.  That stone apparently passed spontaneously.  No stone analysis was done.  On the 5/2023, left renal stones, nonobstructing, were also seen.  He now presents today with signs and symptoms of left stone disease.  However, by the time we got the ultrasound, there was no evidence of an obstructing stone, no hydronephrosis.  Details are above.  Plan:   See plan above.    BMI  Estimated body mass index is 34.87 kg/m  as calculated from the following:    Height as of this encounter: 1.803 m (5' 11\").    Weight as of this encounter: 113.4 kg (250 lb).     Saul Jason is a 38 year old, presenting for the following health issues:  Flank Pain (Left flank pain into left pelvis )    HPI     Abdominal/Flank Pain  Onset/Duration: 1 week worsening sx Wednesday  Description:   Character: Sharp and Stabbing  Location: left upper quadrant  Radiation: None and Pelvic region  Intensity: severe, 8/10  Progression of Symptoms:  worsening, intermittent, and waxing and waning  Accompanying Signs & Symptoms:  Fever/chills: no   Gas/Bloating: YES- bloating  Nausea: no   Vomitting: no   Diarrhea: no   Constipation:no   Dysuria: YES           Hematuria: no            Frequency: YES           Incontinence of urine: no   History:            Last bowel movement: today  Trauma: no   Previous similar pain: YES- on Right side May 2023 when he has kidney stones   Previous tests done: CT           Previous Abdominal surgery: no   Precipitating factors:   Does the pain change with:     Food: no      Bowel Movement: no     Urination: no              Other factors: no   Therapies tried and outcome:  Tylenol no help, Oxycodone 5 mg made pain tolerable, helped for 1-2 hours    When food last eaten: 8 AM barak schmidt     Eren Contreras is a 38-year-old man who presented to his primary care office this morning with a complaint of left flank pain of roughly 5 days duration.  A UA was performed in PCP clinic, " which was notable for moderate blood, and 10-25 RBC.  Due to the severity of his discomfort, he was referred here to the [facility] ([facility] for further evaluation.    Eren has a history of prior renal stone.  In 5/2023 he developed right flank pain.  He was initially seen in urgent care 5/15/2023.  A UA done during that visit was notable for mild hematuria, though imaging was not performed.  He was advised to present to the [facility] the next day at that time, though he could not afford to miss any additional work, and did not go.  On 5/31/2023, he presented to an [facility] ED with ongoing right flank pain.  CT imaging demonstrated an obstructing right ureteral calculus measuring 4 x 2 x 4 mm, associated with moderate right-sided hydronephrosis.  Additional nonobstructing stones were seen in both kidneys, greater on the left.  He was discharged from ED with tamsulosin and analgesics.  He does not know whether he passed the stone, though his pain was gone entirely within 2 weeks.    Today he presents with left-sided flank pain, radiating to the left lower abdominal quadrant, left testis, and to the suprapubic area.  He first noticed the pain on 2/16/2023, although it was not severe to begin with, and he was having brief, intermittent episodes.  However, since about 0300 this morning, his left flank pain has been subjectively severe and unremitting.  He has not noticed any gross hematuria.  He has not had associated fever or chills, though he is sweaty which he relates to pain.  He denies nausea or vomiting.        Review of Systems  As per the history of present illness.  No further pertinent positives or negatives are obtained.      Objective    There were no vitals taken for this visit.  There is no height or weight on file to calculate BMI.  Physical Exam     GENERAL: Pleasant man, lying on a gurney.  He appears very uncomfortable.  EYES: Eyes grossly normal to inspection, extraocular  movements intact  HENT: Nares patent bilaterally, no discharge.    NECK: Trachea midline, no stridor.   RESP: No accessory muscle use.  Symmetrical breath sounds.  Lungs clear throughout on inspiration and expiration.  Expiration not prolonged, no wheeze.  CV: Regular rate and rhythm, non-tachycardic.  Normal S1 S2, no murmur or extra sound.    ABDOMEN: Mildly protuberant, soft, reports tenderness to palpation of the left lower quadrant, though there is, no guarding.  Liver and spleen not enlarged, no masses palpable.  Bowel sounds positive.  MS: No bony deformities noted.  No red or inflamed joints.  SKIN: Warm and dry, no rashes.  NEURO: Alert, oriented, conversant.  Cranial nerves III - XII grossly intact.  No gross motor or sensory deficits.    : There is marked tenderness to palpation of an area over the left flank.  There is also left lower quadrant tenderness as described above.  PSYCH: Alert, conversant.  Able to articulate logical thoughts, no tangential thoughts, no hallucinations or delusions.  Affect is mildly distressed due to pain.    US KIDNEY LEFT 2/23/2024 1:02 PM     CLINICAL HISTORY: History of nephrolithiasis, presents with left flank  pain and hematuria.  Please evaluate for left-sided nephrolithiasis  and possible hydronephrosis.; Acute left flank pain; History of  nephrolithiasis  TECHNIQUE: Routine left Renal and Bladder Ultrasound.     COMPARISON: None.     FINDINGS:     LEFT KIDNEY: 13.2 cm. Normal echogenicity. Echogenic stone material in  the mid kidney measures 10 x 9 x 7 mm. No mass or hydronephrosis.      BLADDER: Normal.                                                                      IMPRESSION:  1.  Nonobstructing left internal calculus. No hydronephrosis.     MARITZA CAGLE MD     Results for orders placed or performed in visit on 02/23/24 (from the past 24 hour(s))   Procalcitonin   Result Value Ref Range    Procalcitonin 0.09 <0.50 ng/mL   Uric acid   Result Value Ref  Range    Uric Acid 5.6 3.4 - 7.0 mg/dL   CBC with platelets   Result Value Ref Range    WBC Count 8.8 4.0 - 11.0 10e3/uL    RBC Count 4.84 4.40 - 5.90 10e6/uL    Hemoglobin 14.5 13.3 - 17.7 g/dL    Hematocrit 42.4 40.0 - 53.0 %    MCV 88 78 - 100 fL    MCH 30.0 26.5 - 33.0 pg    MCHC 34.2 31.5 - 36.5 g/dL    RDW 12.9 10.0 - 15.0 %    Platelet Count 146 (L) 150 - 450 10e3/uL   Comprehensive metabolic panel   Result Value Ref Range    Sodium 137 135 - 145 mmol/L    Potassium 3.8 3.4 - 5.3 mmol/L    Carbon Dioxide (CO2) 22 22 - 29 mmol/L    Anion Gap 10 7 - 15 mmol/L    Urea Nitrogen 12.3 6.0 - 20.0 mg/dL    Creatinine 1.19 (H) 0.67 - 1.17 mg/dL    GFR Estimate 80 >60 mL/min/1.73m2    Calcium 8.5 (L) 8.6 - 10.0 mg/dL    Chloride 105 98 - 107 mmol/L    Glucose 109 (H) 70 - 99 mg/dL    Alkaline Phosphatase 115 40 - 150 U/L    AST 19 0 - 45 U/L    ALT 26 0 - 70 U/L    Protein Total 6.3 (L) 6.4 - 8.3 g/dL    Albumin 3.9 3.5 - 5.2 g/dL    Bilirubin Total 0.3 <=1.2 mg/dL           Signed Electronically by: Yoseph Bejarano MD

## 2024-03-18 ENCOUNTER — OFFICE VISIT (OUTPATIENT)
Dept: UROLOGY | Facility: CLINIC | Age: 39
End: 2024-03-18
Attending: STUDENT IN AN ORGANIZED HEALTH CARE EDUCATION/TRAINING PROGRAM
Payer: COMMERCIAL

## 2024-03-18 VITALS
HEART RATE: 87 BPM | TEMPERATURE: 97.9 F | WEIGHT: 249 LBS | BODY MASS INDEX: 34.73 KG/M2 | DIASTOLIC BLOOD PRESSURE: 86 MMHG | SYSTOLIC BLOOD PRESSURE: 129 MMHG

## 2024-03-18 DIAGNOSIS — Z87.442 HISTORY OF NEPHROLITHIASIS: ICD-10-CM

## 2024-03-18 PROCEDURE — 99203 OFFICE O/P NEW LOW 30 MIN: CPT | Performed by: STUDENT IN AN ORGANIZED HEALTH CARE EDUCATION/TRAINING PROGRAM

## 2024-03-18 ASSESSMENT — PAIN SCALES - GENERAL: PAINLEVEL: NO PAIN (1)

## 2024-03-18 NOTE — PATIENT INSTRUCTIONS
General recommendations to prevent kidney stones:     1) Low oxalate diet. Foods that are particularly high in oxalate include spinach, rhubarb, beets, nuts, nut butters, and black teas.     2) Low salt diet.      3) High fluid intake. Recommend 3 liters of fluid daily to produce goal of 2.5L of urine.     4) Modest animal protein.      5) Normal dietary calcium.     If you would like 24 hour urine collection, call [phone] to let us know.

## 2024-03-18 NOTE — NURSING NOTE
"Initial /86 (BP Location: Right arm, Patient Position: Chair, Cuff Size: Adult Large)   Pulse 87   Temp 97.9  F (36.6  C) (Tympanic)   Wt 112.9 kg (249 lb)   BMI 34.73 kg/m   Estimated body mass index is 34.73 kg/m  as calculated from the following:    Height as of 2/23/24: 1.803 m (5' 11\").    Weight as of this encounter: 112.9 kg (249 lb). .  Mena Harrison LPN    "

## 2024-03-18 NOTE — PROGRESS NOTES
Chief Complaint:   Kidney stones         History of Present Illness:   Eren Contreras is a 38 year old male presenting for an evaluation of kidney stones.     The patient was found to have a 4 mm distal right ureteral stone on CT from 5/31/2023. He thinks he passed the stone on his own. He was seen in urgent care on 2/23/2024 for left flank pain. Left kidney US showed evidence of a 1 cm stone in the mid left kidney with no evidence of hydronephrosis.     Today, he denies left flank pain. He denies dysuria, gross hematuria, and urinary frequency and urgency.     This was his second kidney stone. His older sister makes kidney stones.         Past Medical History:     Past Medical History:   Diagnosis Date    Seizures (H) 8/1/2014            Past Surgical History:     Past Surgical History:   Procedure Laterality Date    NO HISTORY OF SURGERY              Medications     Current Outpatient Medications   Medication    OXcarbazepine (TRILEPTAL) 600 MG tablet    OXCARBAZEPINE PO     No current facility-administered medications for this visit.            Allergies:   Patient has no known allergies.         Review of Systems:  From intake questionnaire   Negative 14 system review except as noted on HPI, nurse's note.         Physical Exam:   Patient is a 38 year old  male   Vitals: Blood pressure 129/86, pulse 87, temperature 97.9  F (36.6  C), temperature source Tympanic, weight 112.9 kg (249 lb).  General Appearance Adult: Alert, no acute distress, oriented.  Lungs: Non-labored breathing.  Heart: No obvious jugular venous distension present.  Neuro: Alert, oriented, speech and mentation normal      Labs and Pathology:    I personally reviewed all applicable laboratory data and went over findings with patient  Significant for:    CBC RESULTS:  Recent Labs   Lab Test 02/23/24  1204 05/15/23  1858   WBC 8.8 7.0   HGB 14.5 16.2   * 185        BMP RESULTS:  Recent Labs   Lab Test 02/23/24  1204       POTASSIUM 3.8   CHLORIDE 105   CO2 22   ANIONGAP 10   *   BUN 12.3   CR 1.19*   GFRESTIMATED 80   DARLINE 8.5*       UA RESULTS:   Recent Labs   Lab Test 24  0919 05/15/23  1858 16  1750   SG 1.020 >=1.030 >1.030   URINEPH 7.0 6.0 6.0   NITRITE Negative Negative Negative   RBCU 10-25* 10-25* >100*   WBCU None Seen None Seen O - 2         Imaging:    I personally reviewed all applicable imaging and went over findings with patient.  Significant for:    Results for orders placed or performed during the hospital encounter of 24   US Kidney Left    Narrative    US KIDNEY LEFT 2024 1:02 PM    CLINICAL HISTORY: History of nephrolithiasis, presents with left flank  pain and hematuria.  Please evaluate for left-sided nephrolithiasis  and possible hydronephrosis.; Acute left flank pain; History of  nephrolithiasis  TECHNIQUE: Routine left Renal and Bladder Ultrasound.    COMPARISON: None.    FINDINGS:    LEFT KIDNEY: 13.2 cm. Normal echogenicity. Echogenic stone material in  the mid kidney measures 10 x 9 x 7 mm. No mass or hydronephrosis.     BLADDER: Normal.      Impression    IMPRESSION:  1.  Nonobstructing left internal calculus. No hydronephrosis.    MARITZA CAGLE MD         SYSTEM ID:  [id number]            Assessment and Plan:   Assessment: Eren Contreras is a 38 year old male seen in evaluation for kidney stones. The patient was found to have a 4 mm distal right ureteral stone on CT from 2023. He thinks he passed the stone on his own. He was seen in urgent care on 2024 for left flank pain. Left kidney US showed evidence of a 1 cm stone in the mid left kidney with no evidence of hydronephrosis.     The patient is now asymptomatic. We discussed obtaining a CT abdomen pelvis on a non-emergent basis to ensure all renal pelvis and ureteral stones have passed. The patient is in agreement.    We reviewed general recommendations to prevent kidney stones including the followin) Low  oxalate diet. We discussed foods that are particularly high in oxalate including spinach, rhubarb, beets, nuts, nut butters, and black teas.     2) Low salt diet. Discussed common foods with high amounts of salt as well as dietary strategies to minimize sodium.     3) High fluid intake. Recommend 3 liters of fluid daily to produce goal of 2.5L of urine.     4) Modest animal protein. Recommend limiting animal protein to one serving or less daily.     5) Normal dietary calcium.     We could consider 24 hour urine collection in the future if the patient is interested.     Plan:  CT abdomen pelvis without contrast to evaluate current stone burden.   Follow up pending lab results.       Bernadette Dillon PA-C  Department of Urology